# Patient Record
Sex: MALE | URBAN - METROPOLITAN AREA
[De-identification: names, ages, dates, MRNs, and addresses within clinical notes are randomized per-mention and may not be internally consistent; named-entity substitution may affect disease eponyms.]

---

## 2019-04-18 ENCOUNTER — OFFICE VISIT (OUTPATIENT)
Dept: URBAN - METROPOLITAN AREA CLINIC 33 | Facility: CLINIC | Age: 39
End: 2019-04-18
Payer: COMMERCIAL

## 2019-04-18 DIAGNOSIS — H53.19 OTHER SUBJECTIVE VISUAL DISTURBANCES: Primary | ICD-10-CM

## 2019-04-18 PROCEDURE — 99203 OFFICE O/P NEW LOW 30 MIN: CPT | Performed by: OPTOMETRIST

## 2019-04-18 ASSESSMENT — INTRAOCULAR PRESSURE
OS: 15
OD: 15

## 2019-04-18 ASSESSMENT — KERATOMETRY
OS: 44.00
OD: 44.38

## 2019-04-18 NOTE — IMPRESSION/PLAN
Impression: Other subjective visual disturbances: H53.19. Plan: Patient reports seeing small blind spot temporal OS. Patient has history of ocular migraines. No gross visual field defects or ONH abnormalities. Patient likely noticing physiologic blind spot. Showed patient physiologic blind spot OD that is same as OS. Monitor only.